# Patient Record
Sex: FEMALE | Race: WHITE | NOT HISPANIC OR LATINO | Employment: FULL TIME | URBAN - METROPOLITAN AREA
[De-identification: names, ages, dates, MRNs, and addresses within clinical notes are randomized per-mention and may not be internally consistent; named-entity substitution may affect disease eponyms.]

---

## 2021-07-29 PROBLEM — R00.2 PALPITATIONS: Status: ACTIVE | Noted: 2021-07-29

## 2021-07-29 PROBLEM — I10 ESSENTIAL HYPERTENSION: Status: ACTIVE | Noted: 2021-07-29

## 2021-07-29 PROBLEM — E78.5 DYSLIPIDEMIA: Status: ACTIVE | Noted: 2021-07-29

## 2021-07-29 PROBLEM — R07.2 PRECORDIAL PAIN: Status: ACTIVE | Noted: 2021-07-29

## 2021-07-29 NOTE — PROGRESS NOTES
Subjective   Emilia Victoria is a 56 y.o. female.  Primary Care: Julienne Dunlap APRN  Referring: Krunla Sun MD  [address] [place] [address],  [place] [address]      Chief Complaint   Patient presents with   • Chest Pain   • Irregular Heart Beat       Patient Active Problem List    Diagnosis Date Noted   • Precordial pain 07/29/2021     Priority: High     Note Last Updated: 7/29/2021     · CT angiogram heart, 6/14/2021: Calcium score corresponds to 0.  Proximal LAD at the origin measures about 2.9 mm and distally 4.4 mm raising the possibility of narrowing of the artery  · GXT stress test, 5/19/2021 patient exercised on a ramped Ok protocol 7-1/2 minutes with atypical symptoms, chest pain actually improved with exercise and upsloping 1 mm ST depression.   • Palpitations  · ZIO monitor for 11 days, 11 hours showed predominantly sinus rhythm with rare PACs and PVCs.  6 SVT runs with the longest lasting 38 beats.  There were no patient triggered events. 07/29/2021     Priority: High     Note Last Updated: 7/29/2021     · Echocardiogram, 5/12/2021: Normal LV size, normal LV wall thickness.  Normal LV systolic function, EF 60-65%.  Grade 1 diastolic dysfunction.  Normal valvular morphology    • Essential hypertension 07/29/2021     Priority: Medium   • Dyslipidemia 07/29/2021     Priority: Medium   • Acid reflux    • Osteoarthritis        History of Present Illness   This is a 56-year-old hypertensive dyslipidemic female referred for chest pain and palpitations.  She states she has a 20-year history of intermittent palpitations.  She had an evaluation about 20 years ago which included a monitor and an echocardiogram both without significant findings.  She is a registered nurse and recently while at work she had sudden onset of heart rate at about 160 bpm.  Before an EKG could be obtained the tachycardia resolved.  She is referred to local cardiology for evaluation.  There she had an echocardiogram  "which was significant only for grade 1 diastolic dysfunction.  She had a GXT stress test which was nondiagnostic.  She had a CTA calcium score with a calcium score of 0 but suggestive of proximal LAD narrowing.  She was advised to have cardiac catheterization.  She presents to our service today for consideration of cardiac catheterization.  She states her \"flutters\" have become more frequent in the past year.  They typically last about 1 minute, are associated with mild dizziness but no syncope.  She states she has residual weakness for several minutes.  She previously did vagal maneuvers which are no longer effective.  Her fluttering is worsened by emotional stress and sometimes by exertion.  She had a recent 2-week Holter monitor which is unavailable for our review at this time.  She has increased awareness of palpitations when supine and laying on her left.  She also complains of chest pain which she describes as \"tightness\" which is elicited with high levels of stress.  She does not have midsternal chest pain with exertion or while working.  Her thyroid function has been checked and is within normal limits.  Her blood pressure at home typically runs about 105 mmHg systolic.  She has been on statin therapy for approximately 10 years with favorable results as noted below.  She drinks approximately 2 caffeinated beverages per day but also consumes 50 to 60 ounces of water daily.  She was prescribed metoprolol which was not tolerated secondary to nausea and fatigue.  She states her baseline heart rate on no beta-blocker is typically about 60 bpm.    Past Surgical History:   Procedure Laterality Date   • COLONOSCOPY     • HYSTERECTOMY     • KIDNEY STONE SURGERY     • NASAL SEPTUM SURGERY     • TONSILLECTOMY AND ADENOIDECTOMY         The following portions of the patient's history were reviewed and updated as appropriate: allergies, current medications, past family history, past medical history, past social history, " "past surgical history and problem list.    Allergies   Allergen Reactions   • Rocephin [Ceftriaxone] Hives   • Metoprolol Nausea And Vomiting   • Zithromax [Azithromycin] Rash         Current Outpatient Medications   Medication Instructions   • Allergy Relief 10 mg, Oral, Daily, For allergies   • aspirin-acetaminophen-caffeine (EXCEDRIN MIGRAINE) 250-250-65 MG per tablet 1 tablet, Oral, Every 6 Hours PRN   • atorvastatin (LIPITOR) 40 mg, Oral, Daily   • Bacillus Coagulans-Inulin (PROBIOTIC FORMULA PO) Oral   • citalopram (CELEXA) 20 mg, Oral, Daily   • diphenhydrAMINE-Phenylephrine (SUDAFED PE DAY & NIGHT PO) Oral   • esomeprazole (nexIUM) 40 MG capsule TAKE ONE CAPSULE BY MOUTH EVERY DAY FOR STOMACH   • Aspirin EC Low Dose 81 mg, Oral, Daily   • naproxen (NAPROSYN) 500 MG tablet TAKE ONE TABLET BY MOUTH TWO TIMES A DAY WITH FOOD FOR PAIN OR INFLAMMATION   • ondansetron (ZOFRAN) 4 mg, Oral, Every 8 Hours PRN   • SM Fiber 625 MG tablet TAKE 2 TABLETS BY MOUTH EVERY DAY WITH LOTS OF WATER **PAID BACK 30 3/4/21 **PAID BACK 30 6/17/21**   • vitamin D (ERGOCALCIFEROL) 50,000 Units, Oral, Weekly         Social History     Socioeconomic History   • Marital status: Single     Spouse name: Not on file   • Number of children: Not on file   • Years of education: Not on file   • Highest education level: Not on file   Tobacco Use   • Smoking status: Never Smoker   • Smokeless tobacco: Current User     Types: Snuff   Vaping Use   • Vaping Use: Never used   Substance and Sexual Activity   • Alcohol use: Never   • Drug use: Never   • Sexual activity: Defer       Family History   Problem Relation Age of Onset   • No Known Problems Mother    • Heart failure Father    • No Known Problems Sister        Objective      /78 (BP Location: Right arm, Patient Position: Sitting)   Pulse 64   Ht 165.1 cm (65\")   Wt 83.5 kg (184 lb)   SpO2 96%   BMI 30.62 kg/m²     Constitutional:       Appearance: Well-developed.   Pulmonary:      " Effort: Pulmonary effort is normal. No respiratory distress.      Breath sounds: Normal breath sounds. No wheezing. No rales.      Comments: Bases clear  Chest:      Chest wall: Not tender to palpatation.   Cardiovascular:      Normal rate. Regular rhythm.      Murmurs: There is no murmur.      No gallop. No click. No rub.   Pulses:     Intact distal pulses.   Edema:     Peripheral edema absent.   Musculoskeletal: Normal range of motion.         ECG 12 Lead    Date/Time: 7/29/2021 3:48 PM  Performed by: Renaldo Juan MD  Authorized by: Renaldo Juan MD   Previous ECG: no previous ECG available  Rhythm: sinus rhythm  Rate: normal  BPM: 64  Conduction: conduction normal  ST Segments: ST segments normal  T Waves: T waves normal  QRS axis: normal  Other: no other findings    Clinical impression: normal ECG            Lab Review:   Labs from primary care dated 6/20/2021:  FLP: Total cholesterol 163, triglycerides 116, HDL 55, LDL 85  TSH: 0.74  CBC: WBC 16, hemoglobin 13.3, hematocrit 42.8, platelets 270      Result Review:    [x]  Laboratory  [x]  Radiology  [x]  EKG/Telemetry   []  Pathology  [x]  Old records  []  Other:        Assessment:   Diagnosis Plan   1. Palpitations   high index of suspicion for SVT.  We will review her Holter monitor study when it becomes available.  We are starting her on bisoprolol 2.5 mg daily   2. Atypical chest pain   bisoprolol 2.5 mg daily   3. Essential hypertension   well managed on current medical regimen   4. Dyslipidemia   well managed on current medical regimen      Plan:  The patient is a 56-year-old female with history of well-managed hypertension and dyslipidemia.  She has experienced symptoms of occasional/random palpitations described as skipping and racing as well as symptoms of chest tightness which is almost always brought on by increased anxiety and stress.  States that she does not have any exertional chest pain or dyspnea.  She has undergone extensive testing with a  local cardiologist that included unremarkable GXT, CT angiogram of the coronary arteries with coronary calcium score of 0 and a prolonged cardiac monitor which did not show any significant arrhythmias.  On coronary CTA a change in the size of LAD was noted which may be due to vessel ectasia or artifact.    The patient has tried metoprolol and was not able to tolerate due to symptoms of fatigue.  At this time we went over the findings of her unremarkable work-up and she was reassured.  I have basically recommended management of her anxiety symptoms and practice of mindfulness.  She will continue current antianxiety medications.  We will treat her with bisoprolol 2.5 mg daily as an alternate beta-blocker with AMI.  She will continue rest of the current medications.  Diet and exercise discussed.  Limitation of caffeine and maintenance of good hydration discussed.  Cardiology follow-up in 3 months, sooner as needed.  Thank you for allowing us to participate in the care of your patient.     Scribed for Renaldo Juan MD by Electronically signed by Electronically signed by LARRY Harvey, 07/30/21, 12:16 PM EDT.      I, Renaldo Juan MD, personally performed the services described in this documentation as scribed by the above named individual in my presence, and it is both accurate and complete.  7/30/2021  16:23 EDT

## 2021-07-30 ENCOUNTER — OFFICE VISIT (OUTPATIENT)
Dept: CARDIOLOGY | Facility: CLINIC | Age: 56
End: 2021-07-30

## 2021-07-30 VITALS
SYSTOLIC BLOOD PRESSURE: 120 MMHG | HEART RATE: 64 BPM | OXYGEN SATURATION: 96 % | WEIGHT: 184 LBS | DIASTOLIC BLOOD PRESSURE: 78 MMHG | HEIGHT: 65 IN | BODY MASS INDEX: 30.66 KG/M2

## 2021-07-30 DIAGNOSIS — I10 ESSENTIAL HYPERTENSION: ICD-10-CM

## 2021-07-30 DIAGNOSIS — E78.5 DYSLIPIDEMIA: ICD-10-CM

## 2021-07-30 DIAGNOSIS — R07.89 ATYPICAL CHEST PAIN: ICD-10-CM

## 2021-07-30 DIAGNOSIS — R00.2 PALPITATIONS: Primary | ICD-10-CM

## 2021-07-30 PROCEDURE — 93000 ELECTROCARDIOGRAM COMPLETE: CPT | Performed by: INTERNAL MEDICINE

## 2021-07-30 PROCEDURE — 99243 OFF/OP CNSLTJ NEW/EST LOW 30: CPT | Performed by: INTERNAL MEDICINE

## 2021-07-30 RX ORDER — LORATADINE 10 MG/1
10 TABLET ORAL DAILY
COMMUNITY
Start: 2021-07-15

## 2021-07-30 RX ORDER — ATORVASTATIN CALCIUM 40 MG/1
40 TABLET, FILM COATED ORAL DAILY
COMMUNITY
Start: 2021-07-14

## 2021-07-30 RX ORDER — ACETAMINOPHEN, ASPIRIN AND CAFFEINE 250; 250; 65 MG/1; MG/1; MG/1
1 TABLET, FILM COATED ORAL EVERY 6 HOURS PRN
COMMUNITY

## 2021-07-30 RX ORDER — ESOMEPRAZOLE MAGNESIUM 40 MG/1
CAPSULE, DELAYED RELEASE ORAL
COMMUNITY
Start: 2021-07-14

## 2021-07-30 RX ORDER — BISOPROLOL FUMARATE 5 MG/1
2.5 TABLET, FILM COATED ORAL DAILY
Qty: 45 TABLET | Refills: 3 | Status: SHIPPED | OUTPATIENT
Start: 2021-07-30 | End: 2021-12-17 | Stop reason: SDUPTHER

## 2021-07-30 RX ORDER — ERGOCALCIFEROL 1.25 MG/1
50000 CAPSULE ORAL WEEKLY
COMMUNITY
Start: 2021-07-15

## 2021-07-30 RX ORDER — ONDANSETRON 4 MG/1
4 TABLET, FILM COATED ORAL EVERY 8 HOURS PRN
COMMUNITY

## 2021-07-30 RX ORDER — CITALOPRAM 20 MG/1
20 TABLET ORAL DAILY
COMMUNITY
Start: 2021-07-14

## 2021-07-30 RX ORDER — ASPIRIN 81 MG/1
81 TABLET, DELAYED RELEASE ORAL DAILY
COMMUNITY
Start: 2021-06-30

## 2021-07-30 RX ORDER — NAPROXEN 500 MG/1
TABLET ORAL
COMMUNITY
Start: 2021-07-14

## 2021-07-30 RX ORDER — CALCIUM POLYCARBOPHIL 625 MG/1
TABLET ORAL
COMMUNITY
Start: 2021-07-14

## 2021-11-10 ENCOUNTER — TELEPHONE (OUTPATIENT)
Dept: CARDIOLOGY | Facility: CLINIC | Age: 56
End: 2021-11-10

## 2021-11-10 NOTE — TELEPHONE ENCOUNTER
Pt LVM requesting call back from RN. Attempted to return call, no answer. LVM w call back number.

## 2021-12-17 ENCOUNTER — OFFICE VISIT (OUTPATIENT)
Dept: CARDIOLOGY | Facility: CLINIC | Age: 56
End: 2021-12-17

## 2021-12-17 VITALS
SYSTOLIC BLOOD PRESSURE: 110 MMHG | WEIGHT: 183 LBS | HEIGHT: 65 IN | BODY MASS INDEX: 30.49 KG/M2 | DIASTOLIC BLOOD PRESSURE: 78 MMHG

## 2021-12-17 DIAGNOSIS — E78.5 DYSLIPIDEMIA: ICD-10-CM

## 2021-12-17 DIAGNOSIS — R07.2 PRECORDIAL PAIN: Primary | ICD-10-CM

## 2021-12-17 DIAGNOSIS — R00.2 PALPITATIONS: ICD-10-CM

## 2021-12-17 DIAGNOSIS — I10 ESSENTIAL HYPERTENSION: ICD-10-CM

## 2021-12-17 PROCEDURE — 99441 PR PHYS/QHP TELEPHONE EVALUATION 5-10 MIN: CPT | Performed by: INTERNAL MEDICINE

## 2021-12-17 RX ORDER — BISOPROLOL FUMARATE 5 MG/1
2.5 TABLET, FILM COATED ORAL DAILY
Qty: 45 TABLET | Refills: 3 | Status: SHIPPED | OUTPATIENT
Start: 2021-12-17

## 2021-12-17 NOTE — PROGRESS NOTES
[facility] Cardiology    Encounter Date:2021    Patient ID: Emilia Victoria is a 56 y.o. female.  : 1965     PCP: Julienne Dunlap APRN       Chief Complaint: Palpitations      PROBLEM LIST:  1. Precordial pain   a. CT angiogram heart, 2021: Calcium score corresponds to 0.  Proximal LAD at the origin measures about 2.9 mm and distally 4.4 mm raising the possibility of narrowing of the artery.  b. GXT stress test, 2021 patient exercised on a ramped Ok protocol 7-1/2 minutes with atypical symptoms, chest pain actually improved with exercise and upsloping 1 mm ST depression.  2. Palpitations   a. 11d Zio, 2021: Predominantly SR with rare PACs and PVCs. 6 SVT runs with the longest lasting 38 beats. No patient triggered events.  b. Echocardiogram, 2021: EF 60-65%. Normal LV size, normal LV wall thickness. Grade 1 diastolic dysfunction. Normal valvular morphology.  3. Hypertension   4. Dyslipidemia   5. Acid reflux   6. Osteoarthritis     History of Present Illness  Patient has a a telephone visit today for a 3 month follow-up with a history of palpitations, precordial pain, and cardiac risk factors. Since last visit, he has done well from cardiac standpoint.  States that with bisoprolol her palpitations have remarkably improved.  She only takes 1/4 tablet daily instead of half tablet as half was too strong for her.  Chest pains have also improved without any significant recurrences.  Remains active and busy without any other significant complaints.    Allergies   Allergen Reactions   • Rocephin [Ceftriaxone] Hives   • Metoprolol Nausea And Vomiting   • Zithromax [Azithromycin] Rash         Current Outpatient Medications:   •  Allergy Relief 10 MG tablet, Take 10 mg by mouth Daily. For allergies, Disp: , Rfl:   •  aspirin-acetaminophen-caffeine (EXCEDRIN MIGRAINE) 250-250-65 MG per tablet, Take 1 tablet by mouth Every 6 (Six) Hours As Needed for  Headache., Disp: , Rfl:   •  atorvastatin (LIPITOR) 40 MG tablet, Take 40 mg by mouth Daily., Disp: , Rfl:   •  Bacillus Coagulans-Inulin (PROBIOTIC FORMULA PO), Take  by mouth., Disp: , Rfl:   •  bisoprolol (ZEBeta) 5 MG tablet, Take 0.5 tablets by mouth Daily., Disp: 45 tablet, Rfl: 3  •  citalopram (CeleXA) 20 MG tablet, Take 20 mg by mouth Daily., Disp: , Rfl:   •  diphenhydrAMINE-Phenylephrine (SUDAFED PE DAY & NIGHT PO), Take  by mouth., Disp: , Rfl:   •  esomeprazole (nexIUM) 40 MG capsule, TAKE ONE CAPSULE BY MOUTH EVERY DAY FOR STOMACH, Disp: , Rfl:   •  HM Aspirin EC Low Dose 81 MG EC tablet, Take 81 mg by mouth Daily., Disp: , Rfl:   •  naproxen (NAPROSYN) 500 MG tablet, TAKE ONE TABLET BY MOUTH TWO TIMES A DAY WITH FOOD FOR PAIN OR INFLAMMATION, Disp: , Rfl:   •  ondansetron (ZOFRAN) 4 MG tablet, Take 4 mg by mouth Every 8 (Eight) Hours As Needed for Nausea or Vomiting., Disp: , Rfl:   •  SM Fiber 625 MG tablet, TAKE 2 TABLETS BY MOUTH EVERY DAY WITH LOTS OF WATER **PAID BACK 30 3/4/21 **PAID BACK 30 6/17/21**, Disp: , Rfl:   •  vitamin D (ERGOCALCIFEROL) 1.25 MG (04555 UT) capsule capsule, Take 50,000 Units by mouth 1 (One) Time Per Week., Disp: , Rfl:     The following portions of the patient's history were reviewed and updated as appropriate: allergies, current medications, past family history, past medical history, past social history, past surgical history and problem list.    ROS  Review of Systems   Constitution: Negative for chills, fever, fatigue, generalized weakness.   Cardiovascular: Pertinent positives in HPI, otherwise negative.  Respiratory: Pertinent positives in HPI, otherwise negative.  HENT: Negative for ear pain, nosebleeds, and tinnitus.  Gastrointestinal: Negative for abdominal pain, constipation, diarrhea, nausea and vomiting.   Genitourinary: No urinary symptoms.  Musculoskeletal: Negative for muscle cramps.  Neurological: Negative for headaches, loss of balance, numbness, and  "symptoms of stroke.  Psychiatric: Normal mental status.     All other systems reviewed and are negative.    Objective:     /78 (BP Location: Right arm, Patient Position: Sitting) Comment: Pt stated was taken a week ago  Ht 165.1 cm (65\")   Wt 83 kg (183 lb)   BMI 30.45 kg/m²        Physical Exam  No physical exam performed secondary to telephone visit.   Vitals reviewed.    Lab Review:     Labs from primary care dated 6/20/2021:  FLP: Total cholesterol 163, triglycerides 116, HDL 55, LDL 85  TSH: 0.74  CBC: WBC 16, hemoglobin 13.3, hematocrit 42.8, platelets 270    Procedures       Assessment:      Diagnosis Plan   1. Precordial pain   atypical, negative work-up, improved/stable.   2. Palpitations   due to awareness of benign ventricular arrhythmias, improved.  Continue bisoprolol.     3. Essential hypertension   controlled, continue current medications.   4. Dyslipidemia   continue statin therapy, monitored by PCP.     Plan:   Findings of stress test, echocardiogram and cardiac monitor discussed and she was reassured that no significant abnormalities are noted.  She was advised to maintain good hydration and continue to avoid caffeinated beverages.  Continue current medications.   FU in 12 MO, sooner as needed.  Thank you for allowing us to participate in the care of your patient.     This patient has consented to a telehealth visit via telephone. The visit was scheduled as a telephone visit to comply with patient safety concerns in accordance with CDC recommendations.  All vitals recorded within this visit are reported by the patient.  I spent 10 minutes in total including but not limited to the 5 minutes spent in direct conversation with this patient.       Renaldo Juan MD, FAC, [facility]      Please note that portions of this note may have been completed with a voice recognition program. Efforts were made to edit the dictations, but occasionally words are mistranscribed.        "